# Patient Record
Sex: MALE | Race: WHITE | NOT HISPANIC OR LATINO | Employment: OTHER | URBAN - NONMETROPOLITAN AREA
[De-identification: names, ages, dates, MRNs, and addresses within clinical notes are randomized per-mention and may not be internally consistent; named-entity substitution may affect disease eponyms.]

---

## 2024-06-17 LAB
NON-UH HIE BASOPHILS # (AUTO): 0.1 10*3/UL (ref 0–0.2)
NON-UH HIE BASOPHILS % (AUTO): 1.1 %
NON-UH HIE EOSINOPHILS # (AUTO): 0 10*3/UL (ref 0–0.45)
NON-UH HIE EOSINOPHILS % (AUTO): 0.9 %
NON-UH HIE HEMATOCRIT: 35.4 % (ref 38.8–50)
NON-UH HIE HEMOGLOBIN: 11.5 G/DL (ref 13–17)
NON-UH HIE LYMPHOCYTES # (AUTO): 1.4 10*3/UL (ref 1–4.8)
NON-UH HIE LYMPHOCYTES % (AUTO): 28.4 %
NON-UH HIE MEAN CORPUSCULAR HEMOGLOBIN: 29.4 PG (ref 27.5–35.2)
NON-UH HIE MEAN CORPUSCULAR HGB CONC: 32.6 G/DL (ref 32.5–35.6)
NON-UH HIE MEAN CORPUSCULAR VOLUME: 90.2 FL (ref 83.5–101)
NON-UH HIE MEAN PLATELET VOLUME: 7.9 FL (ref 6.6–10.1)
NON-UH HIE MONOCYTES # (AUTO): 0.5 10*3/UL (ref 0–0.8)
NON-UH HIE MONOCYTES % (AUTO): 9.7 %
NON-UH HIE NEUTROPHILS # (AUTO): 3 10*3/UL (ref 1.8–7.7)
NON-UH HIE NEUTROPHILS % (AUTO): 59.9 %
NON-UH HIE NRBC%: 0.1 /100{WBC} (ref 0–0.5)
NON-UH HIE PLATELET COUNT: 165 10*3/UL (ref 150–450)
NON-UH HIE RED BLOOD COUNT: 3.92 (ref 3.9–5.6)
NON-UH HIE RED CELL DISTRIBUTION WIDTH: 16.2 % (ref 12–14.8)
NON-UH HIE UNCORRECTED WBC: 5.1 10*3/UL (ref 4.1–10.5)
NON-UH HIE WHITE BLOOD COUNT: 5.1 10*3/UL (ref 4.1–10.5)

## 2024-06-28 ENCOUNTER — OFFICE VISIT (OUTPATIENT)
Dept: CARDIOLOGY | Facility: CLINIC | Age: 84
End: 2024-06-28
Payer: COMMERCIAL

## 2024-06-28 VITALS
WEIGHT: 126 LBS | SYSTOLIC BLOOD PRESSURE: 86 MMHG | HEART RATE: 74 BPM | HEIGHT: 69 IN | BODY MASS INDEX: 18.66 KG/M2 | DIASTOLIC BLOOD PRESSURE: 48 MMHG

## 2024-06-28 DIAGNOSIS — Z01.810 PREOP CARDIOVASCULAR EXAM: ICD-10-CM

## 2024-06-28 DIAGNOSIS — I48.0 PAROXYSMAL ATRIAL FIBRILLATION (MULTI): ICD-10-CM

## 2024-06-28 DIAGNOSIS — F17.200 CURRENT SMOKER: ICD-10-CM

## 2024-06-28 DIAGNOSIS — R94.31 ABNORMAL EKG: ICD-10-CM

## 2024-06-28 DIAGNOSIS — I25.10 CORONARY ARTERY DISEASE INVOLVING NATIVE CORONARY ARTERY OF NATIVE HEART, UNSPECIFIED WHETHER ANGINA PRESENT: ICD-10-CM

## 2024-06-28 DIAGNOSIS — Z98.61 HISTORY OF PTCA: ICD-10-CM

## 2024-06-28 DIAGNOSIS — I25.5 CARDIOMYOPATHY, ISCHEMIC: ICD-10-CM

## 2024-06-28 DIAGNOSIS — K56.699 OTHER SPECIFIED INTESTINAL OBSTRUCTION, UNSPECIFIED WHETHER PARTIAL OR COMPLETE (MULTI): Primary | ICD-10-CM

## 2024-06-28 DIAGNOSIS — N18.31 CHRONIC KIDNEY DISEASE (CKD) STAGE G3A/A3, MODERATELY DECREASED GLOMERULAR FILTRATION RATE (GFR) BETWEEN 45-59 ML/MIN/1.73 SQUARE METER AND ALBUMINURIA CREATININE RATIO GREATER THAN 300 MG/G * (MULTI): ICD-10-CM

## 2024-06-28 DIAGNOSIS — Z79.899 HIGH RISK MEDICATION USE: ICD-10-CM

## 2024-06-28 PROBLEM — Z95.828 HX OF ENDOVASCULAR STENT GRAFT FOR ABDOMINAL AORTIC ANEURYSM: Status: ACTIVE | Noted: 2024-06-28

## 2024-06-28 PROBLEM — I71.40 ABDOMINAL AORTIC ANEURYSM (AAA) (CMS-HCC): Status: ACTIVE | Noted: 2024-06-28

## 2024-06-28 PROBLEM — I35.0 MILD AORTIC STENOSIS: Status: ACTIVE | Noted: 2024-06-28

## 2024-06-28 RX ORDER — PANTOPRAZOLE SODIUM 40 MG/1
40 TABLET, DELAYED RELEASE ORAL DAILY
COMMUNITY

## 2024-06-28 RX ORDER — AMIODARONE HYDROCHLORIDE 200 MG/1
200 TABLET ORAL DAILY
COMMUNITY
Start: 2024-05-18

## 2024-06-28 RX ORDER — CLOPIDOGREL BISULFATE 75 MG/1
1 TABLET ORAL DAILY
COMMUNITY
Start: 2018-05-21

## 2024-06-28 RX ORDER — ALBUTEROL SULFATE 90 UG/1
AEROSOL, METERED RESPIRATORY (INHALATION)
COMMUNITY

## 2024-06-28 RX ORDER — CITALOPRAM 20 MG/1
1 TABLET, FILM COATED ORAL DAILY
COMMUNITY

## 2024-06-28 RX ORDER — DILTIAZEM HYDROCHLORIDE 180 MG/1
180 CAPSULE, COATED, EXTENDED RELEASE ORAL DAILY
COMMUNITY

## 2024-06-28 RX ORDER — CARVEDILOL 6.25 MG/1
6.25 TABLET ORAL 2 TIMES DAILY
COMMUNITY

## 2024-06-28 RX ORDER — MIRTAZAPINE 7.5 MG/1
7.5 TABLET, FILM COATED ORAL NIGHTLY
COMMUNITY
Start: 2024-06-26 | End: 2024-12-23

## 2024-06-28 RX ORDER — NAPROXEN SODIUM 220 MG/1
1 TABLET, FILM COATED ORAL DAILY
COMMUNITY

## 2024-06-28 RX ORDER — NITROGLYCERIN 0.4 MG/1
0.4 TABLET SUBLINGUAL
COMMUNITY
Start: 2018-05-01

## 2024-06-28 NOTE — LETTER
June 28, 2024     Sanjiv Reddy MD  [address] [address]    Patient: Jacob Frost   YOB: 1940   Date of Visit: 6/28/2024       Dear Dr. Sanjiv Reddy MD:    Thank you for referring Jacob Frost to me for evaluation. Below are my notes for this consultation.  If you have questions, please do not hesitate to call me. I look forward to following your patient along with you.       Sincerely,     Viral Valle MD      CC: No Recipients  ______________________________________________________________________________________    Cardiology Consultation- New Consult    Reason for referral: Cardiac clearance prior to colonoscopy.    HPI: Jacob Frost is a 84 y.o. male who is being seen at request of gastroenterology for cardiac risk assessment prior to colonoscopy scheduled in the near future.  The patient has extensive cardiac history which I reviewed including old records we have in our system.  His last cardiac catheterization in January 2021 by Dr. White demonstrated 80% RCA stenosis requiring placement of drug-eluting stent, his previously positioned stent in the LAD/diagonal bifurcation was widely patent.  At the time his ejection fraction was 30% and his left main and left circumflex have no significant disease.  He has history of paroxysmal afibrillation which has been treated with amiodarone but no anticoagulation.  The patient has followed with PCP Dr. Jacob Yan since that time and has not followed in our office.  6 weeks ago the patient had urgent abdominal surgery at [facility] for twisted small intestine, the underlying pathology is not known to me.  He had part of the ileum resected and now is scheduled for colonoscopy for follow-up.  He is not aware of cancer, colitis or Crohn's disease.  The workup is to further investigate the pathology.  Currently he reports no angina or heart failure symptoms.  Since the initial intervention few weeks ago he  lost nearly 30 pounds.  He has no hematuria melena or hematochezia.  He has no abdominal pain nausea or vomiting but his appetite is poor.  He is a regular pack-a-day smoker and he is status post AAA EVAR.  He has mild COPD.  He is a retired .  He currently has no symptoms suggestive of unstable heart failure, cardiac arrhythmias or ischemic heart disease.  He has not had a stress test since the intervention in 2021.    Assessment/recommendations:    1-patient need cardiac clearance prior to colonoscopy.  His cardiac risks at the present time are considered to be acceptable given the absence of decompensated heart failure, significant cardiac arrhythmias or unstable angina.  The procedure to proceed with his low risk procedure.  No further cardiac testing will be necessary prior to clearing him for surgery.  A letter to that effect will be sent to Dr. Reddy  2-extensive cardiac history with multivessel coronary artery disease status post PCI of the LAD diagonal bifurcation in the remote past and PCI of the RCA in 2021.  His left circumflex has no significant disease and left main has no significant disease.  Aggressive risk factors modification was recommended and regular cardiac follow-up was encouraged.  Plavix has been placed on hold in preparation for the procedure.  Tobacco cessation was advocated strongly.  The patient need to be on statin therapy  3-ischemic cardiomyopathy, compensated for the time being.  Ejection fraction 30-35%.  He is on beta-blocker and diltiazem.  Diltiazem need to be discontinued and replaced by an ARB such as valsartan 40 mg daily.  Discussed with the patient eventually consideration for AICD but he will need an echocardiogram before that is considered.  Continue carvedilol  4-status post EVAR for AAA, functioning properly  5- stage III chronic kidney disease, creatinine 1.3 mg/dL June 2024, creatinine clearance 52 mL/min.  Will monitor  6-paroxysmal atrial fibrillation  currently on amiodarone and in sinus rhythm, he is not anticoagulated will discuss valve in later appointment  7-high risk medication with amiodarone, will initiate amiodarone testing later on.  8-dyslipidemia, currently not on statin therapy.  Will address at after surgery  9-active tobacco use, was counseled on the need for tobacco cessation        Past Medical History:   He has a past medical history of Personal history of peptic ulcer disease and Stenosis of bilateral lacrimal sac.    Surgical History:   He has a past surgical history that includes Other surgical history (12/11/2017); Tonsillectomy (12/11/2017); and Other surgical history (09/05/2018).    Family History:   No family history on file.    Social History:   Social History     Tobacco Use   • Smoking status: Every Day     Types: Cigarettes   • Smokeless tobacco: Never   Substance Use Topics   • Alcohol use: Not Currently        Allergies:  Iodinated contrast media, Statins-hmg-coa reductase inhibitors, Codeine, Lisinopril, and Penicillins     Current Medications:    Current Outpatient Medications:   •  albuterol (Ventolin HFA) 90 mcg/actuation inhaler, Inhale 2 puffs every 4 hours by inhalation route., Disp: , Rfl:   •  amiodarone (Pacerone) 200 mg tablet, Take 1 tablet (200 mg) by mouth once daily., Disp: , Rfl:   •  aspirin 81 mg chewable tablet, Chew 1 tablet (81 mg) once daily., Disp: , Rfl:   •  carvedilol (Coreg) 6.25 mg tablet, Take 1 tablet (6.25 mg) by mouth 2 times a day., Disp: , Rfl:   •  citalopram (CeleXA) 20 mg tablet, Take 1 tablet (20 mg) by mouth once daily., Disp: , Rfl:   •  clopidogrel (Plavix) 75 mg tablet, Take 1 tablet (75 mg) by mouth once daily., Disp: , Rfl:   •  dilTIAZem CD (Cardizem CD) 180 mg 24 hr capsule, Take 1 capsule (180 mg) by mouth once daily., Disp: , Rfl:   •  mirtazapine (Remeron) 7.5 mg tablet, Take 1 tablet (7.5 mg) by mouth once daily at bedtime., Disp: , Rfl:   •  nitroglycerin (Nitrostat) 0.4 mg SL  "tablet, Place 1 tablet (0.4 mg) under the tongue., Disp: , Rfl:   •  pantoprazole (ProtoNix) 40 mg EC tablet, Take 1 tablet (40 mg) by mouth once daily., Disp: , Rfl:   •  ubidecarenone (COENZYME Q10, BULK, MISC), Take 200 mg by mouth., Disp: , Rfl:      Vitals:  Vitals:    06/28/24 1143 06/28/24 1144   BP: 124/50 (!) 86/48   BP Location: Left arm Right arm   Patient Position: Sitting Sitting   Pulse: 74    Weight: 57.2 kg (126 lb)    Height: 1.753 m (5' 9\")     EKG done in office today        Review of Systems   All other systems reviewed and are negative.      Objective        Physical Exam  Constitutional:       Appearance: Normal appearance.   HENT:      Nose: Nose normal.   Neck:      Vascular: No carotid bruit.   Cardiovascular:      Rate and Rhythm: Normal rate.      Pulses: Normal pulses.      Heart sounds: Normal heart sounds.   Pulmonary:      Effort: Pulmonary effort is normal.   Abdominal:      General: Bowel sounds are normal.      Palpations: Abdomen is soft.   Musculoskeletal:         General: Normal range of motion.      Cervical back: Normal range of motion.      Right lower leg: No edema.      Left lower leg: No edema.   Skin:     General: Skin is warm and dry.   Neurological:      General: No focal deficit present.      Mental Status: He is alert.   Psychiatric:         Mood and Affect: Mood normal.         Behavior: Behavior normal.         Thought Content: Thought content normal.         Judgment: Judgment normal.                Assessment and Plan:   1. Preop cardiovascular exam        2. Abnormal EKG        3. Cardiomyopathy, ischemic        4. Coronary artery disease involving native coronary artery of native heart, unspecified whether angina present        5. History of PTCA        6. Paroxysmal atrial fibrillation (Multi)        7. High risk medication use        8. Mild aortic stenosis        9. Current smoker        10. BMI less than 19,adult               Scribe Attestation  By signing " my name below, I, Robina Chandra LPN   attest that this documentation has been prepared under the direction and in the presence of Viral Valle MD.    Provider Attestation - Scribe documentation    All medical record entries made by the Scribe were at my direction and personally dictated by me. I have reviewed the chart and agree that the record accurately reflects my personal performance of the history, physical exam, discussion and plan.

## 2024-06-28 NOTE — PROGRESS NOTES
Cardiology Consultation- New Consult    Reason for referral: Cardiac clearance prior to colonoscopy.    HPI: Jacob Frost is a 84 y.o. male who is being seen at request of gastroenterology for cardiac risk assessment prior to colonoscopy scheduled in the near future.  The patient has extensive cardiac history which I reviewed including old records we have in our system.  His last cardiac catheterization in January 2021 by Dr. White demonstrated 80% RCA stenosis requiring placement of drug-eluting stent, his previously positioned stent in the LAD/diagonal bifurcation was widely patent.  At the time his ejection fraction was 30% and his left main and left circumflex have no significant disease.  He has history of paroxysmal afibrillation which has been treated with amiodarone but no anticoagulation.  The patient has followed with PCP Dr. Jacob Yan since that time and has not followed in our office.  6 weeks ago the patient had urgent abdominal surgery at [facility] for twisted small intestine, the underlying pathology is not known to me.  He had part of the ileum resected and now is scheduled for colonoscopy for follow-up.  He is not aware of cancer, colitis or Crohn's disease.  The workup is to further investigate the pathology.  Currently he reports no angina or heart failure symptoms.  Since the initial intervention few weeks ago he lost nearly 30 pounds.  He has no hematuria melena or hematochezia.  He has no abdominal pain nausea or vomiting but his appetite is poor.  He is a regular pack-a-day smoker and he is status post AAA EVAR.  He has mild COPD.  He is a retired .  He currently has no symptoms suggestive of unstable heart failure, cardiac arrhythmias or ischemic heart disease.  He has not had a stress test since the intervention in 2021.    Assessment/recommendations:    1-patient need cardiac clearance prior to colonoscopy.  His cardiac risks at the present time are considered to be  acceptable given the absence of decompensated heart failure, significant cardiac arrhythmias or unstable angina.  The procedure to proceed with his low risk procedure.  No further cardiac testing will be necessary prior to clearing him for surgery.  A letter to that effect will be sent to Dr. Reddy  2-extensive cardiac history with multivessel coronary artery disease status post PCI of the LAD diagonal bifurcation in the remote past and PCI of the RCA in 2021.  His left circumflex has no significant disease and left main has no significant disease.  Aggressive risk factors modification was recommended and regular cardiac follow-up was encouraged.  Plavix has been placed on hold in preparation for the procedure.  Tobacco cessation was advocated strongly.  The patient need to be on statin therapy  3-ischemic cardiomyopathy, compensated for the time being.  Ejection fraction 30-35%.  He is on beta-blocker and diltiazem.  Diltiazem need to be discontinued and replaced by an ARB such as valsartan 40 mg daily.  Discussed with the patient eventually consideration for AICD but he will need an echocardiogram before that is considered.  Continue carvedilol  4-status post EVAR for AAA, functioning properly  5- stage III chronic kidney disease, creatinine 1.3 mg/dL June 2024, creatinine clearance 52 mL/min.  Will monitor  6-paroxysmal atrial fibrillation currently on amiodarone and in sinus rhythm, he is not anticoagulated will discuss valve in later appointment  7-high risk medication with amiodarone, will initiate amiodarone testing later on.  8-dyslipidemia, currently not on statin therapy.  Will address at after surgery  9-active tobacco use, was counseled on the need for tobacco cessation        Past Medical History:   He has a past medical history of Personal history of peptic ulcer disease and Stenosis of bilateral lacrimal sac.    Surgical History:   He has a past surgical history that includes Other surgical history  "(12/11/2017); Tonsillectomy (12/11/2017); and Other surgical history (09/05/2018).    Family History:   No family history on file.    Social History:   Social History     Tobacco Use    Smoking status: Every Day     Types: Cigarettes    Smokeless tobacco: Never   Substance Use Topics    Alcohol use: Not Currently        Allergies:  Iodinated contrast media, Statins-hmg-coa reductase inhibitors, Codeine, Lisinopril, and Penicillins     Current Medications:    Current Outpatient Medications:     albuterol (Ventolin HFA) 90 mcg/actuation inhaler, Inhale 2 puffs every 4 hours by inhalation route., Disp: , Rfl:     amiodarone (Pacerone) 200 mg tablet, Take 1 tablet (200 mg) by mouth once daily., Disp: , Rfl:     aspirin 81 mg chewable tablet, Chew 1 tablet (81 mg) once daily., Disp: , Rfl:     carvedilol (Coreg) 6.25 mg tablet, Take 1 tablet (6.25 mg) by mouth 2 times a day., Disp: , Rfl:     citalopram (CeleXA) 20 mg tablet, Take 1 tablet (20 mg) by mouth once daily., Disp: , Rfl:     clopidogrel (Plavix) 75 mg tablet, Take 1 tablet (75 mg) by mouth once daily., Disp: , Rfl:     dilTIAZem CD (Cardizem CD) 180 mg 24 hr capsule, Take 1 capsule (180 mg) by mouth once daily., Disp: , Rfl:     mirtazapine (Remeron) 7.5 mg tablet, Take 1 tablet (7.5 mg) by mouth once daily at bedtime., Disp: , Rfl:     nitroglycerin (Nitrostat) 0.4 mg SL tablet, Place 1 tablet (0.4 mg) under the tongue., Disp: , Rfl:     pantoprazole (ProtoNix) 40 mg EC tablet, Take 1 tablet (40 mg) by mouth once daily., Disp: , Rfl:     ubidecarenone (COENZYME Q10, BULK, MISC), Take 200 mg by mouth., Disp: , Rfl:      Vitals:  Vitals:    06/28/24 1143 06/28/24 1144   BP: 124/50 (!) 86/48   BP Location: Left arm Right arm   Patient Position: Sitting Sitting   Pulse: 74    Weight: 57.2 kg (126 lb)    Height: 1.753 m (5' 9\")     EKG done in office today        Review of Systems   All other systems reviewed and are negative.      Objective         Physical " Exam  Constitutional:       Appearance: Normal appearance.   HENT:      Nose: Nose normal.   Neck:      Vascular: No carotid bruit.   Cardiovascular:      Rate and Rhythm: Normal rate.      Pulses: Normal pulses.      Heart sounds: Normal heart sounds.   Pulmonary:      Effort: Pulmonary effort is normal.   Abdominal:      General: Bowel sounds are normal.      Palpations: Abdomen is soft.   Musculoskeletal:         General: Normal range of motion.      Cervical back: Normal range of motion.      Right lower leg: No edema.      Left lower leg: No edema.   Skin:     General: Skin is warm and dry.   Neurological:      General: No focal deficit present.      Mental Status: He is alert.   Psychiatric:         Mood and Affect: Mood normal.         Behavior: Behavior normal.         Thought Content: Thought content normal.         Judgment: Judgment normal.                Assessment and Plan:   1. Preop cardiovascular exam        2. Abnormal EKG        3. Cardiomyopathy, ischemic        4. Coronary artery disease involving native coronary artery of native heart, unspecified whether angina present        5. History of PTCA        6. Paroxysmal atrial fibrillation (Multi)        7. High risk medication use        8. Mild aortic stenosis        9. Current smoker        10. BMI less than 19,adult               Scribe Attestation  By signing my name below, IJosselin LPN Scribe   attest that this documentation has been prepared under the direction and in the presence of Viral Valle MD.    Provider Attestation - Scribe documentation    All medical record entries made by the Scribe were at my direction and personally dictated by me. I have reviewed the chart and agree that the record accurately reflects my personal performance of the history, physical exam, discussion and plan.

## 2024-06-28 NOTE — PATIENT INSTRUCTIONS
Please bring all medicines, vitamins, and herbal supplements with you when you come to the office.    Prescriptions will not be filled unless you are compliant with your follow up appointments or have a follow up appointment scheduled as per instruction of your physician. Refills should be requested at the time of your visit.     BMI was above normal measurement. Current weight: 57.2 kg (126 lb)  Weight change since last visit (-) denotes wt loss -35 lbs   Weight loss needed to achieve BMI 25: -42.9 Lbs  Weight loss needed to achieve BMI 30: -76.7 Lbs  BMI was below normal measurement. Current weight: 57.2 kg (126 lb)  Weight change since last visit (-) denotes wt loss -35 lbs   Weight gain needed to achieve  BMI of 18.5 = 1 Lbs    Instructed patient to consume high calorie, high protein diet.    Amiodarone follow up per routine     Jacob Frost is clear for surgery from a cardiac standpoint, hold plavix 5 days prior to procedure

## 2024-07-23 ENCOUNTER — HOSPITAL ENCOUNTER (EMERGENCY)
Age: 84
Discharge: LEFT BEFORE DISCHARGE | End: 2024-07-23
Payer: COMMERCIAL

## 2024-07-23 VITALS
OXYGEN SATURATION: 96 % | TEMPERATURE: 98.1 F | DIASTOLIC BLOOD PRESSURE: 70 MMHG | SYSTOLIC BLOOD PRESSURE: 122 MMHG | HEART RATE: 96 BPM

## 2024-07-23 VITALS — BODY MASS INDEX: 19.5 KG/M2

## 2024-07-23 DIAGNOSIS — M79.89: Primary | ICD-10-CM

## 2024-07-23 DIAGNOSIS — Z53.29: ICD-10-CM

## 2024-07-23 PROCEDURE — 93971 EXTREMITY STUDY: CPT

## 2024-07-23 PROCEDURE — 99284 EMERGENCY DEPT VISIT MOD MDM: CPT

## 2024-08-05 ENCOUNTER — HOSPITAL ENCOUNTER (OUTPATIENT)
Dept: RADIOLOGY | Facility: CLINIC | Age: 84
Discharge: HOME | End: 2024-08-05
Payer: COMMERCIAL

## 2024-08-05 ENCOUNTER — HOSPITAL ENCOUNTER (OUTPATIENT)
Dept: CARDIOLOGY | Facility: CLINIC | Age: 84
Discharge: HOME | End: 2024-08-05
Payer: COMMERCIAL

## 2024-08-05 VITALS — HEART RATE: 60 BPM | SYSTOLIC BLOOD PRESSURE: 126 MMHG | DIASTOLIC BLOOD PRESSURE: 84 MMHG

## 2024-08-05 DIAGNOSIS — I25.5 CARDIOMYOPATHY, ISCHEMIC: ICD-10-CM

## 2024-08-05 DIAGNOSIS — R94.31 ABNORMAL EKG: ICD-10-CM

## 2024-08-05 DIAGNOSIS — I25.10 CORONARY ARTERY DISEASE INVOLVING NATIVE CORONARY ARTERY OF NATIVE HEART, UNSPECIFIED WHETHER ANGINA PRESENT: ICD-10-CM

## 2024-08-05 DIAGNOSIS — Z01.810 PREOP CARDIOVASCULAR EXAM: ICD-10-CM

## 2024-08-05 PROCEDURE — 93017 CV STRESS TEST TRACING ONLY: CPT

## 2024-08-05 PROCEDURE — A9502 TC99M TETROFOSMIN: HCPCS | Performed by: INTERNAL MEDICINE

## 2024-08-05 PROCEDURE — 3430000001 HC RX 343 DIAGNOSTIC RADIOPHARMACEUTICALS: Performed by: INTERNAL MEDICINE

## 2024-08-05 PROCEDURE — 2500000004 HC RX 250 GENERAL PHARMACY W/ HCPCS (ALT 636 FOR OP/ED): Performed by: INTERNAL MEDICINE

## 2024-08-05 PROCEDURE — 78452 HT MUSCLE IMAGE SPECT MULT: CPT

## 2024-08-05 RX ORDER — REGADENOSON 0.08 MG/ML
0.4 INJECTION, SOLUTION INTRAVENOUS ONCE
Status: COMPLETED | OUTPATIENT
Start: 2024-08-05 | End: 2024-08-05

## 2024-08-06 ENCOUNTER — TELEPHONE (OUTPATIENT)
Dept: CARDIOLOGY | Facility: CLINIC | Age: 84
End: 2024-08-06
Payer: COMMERCIAL

## 2024-08-06 DIAGNOSIS — I25.5 CARDIOMYOPATHY, ISCHEMIC: ICD-10-CM

## 2024-08-06 RX ORDER — VALSARTAN 40 MG/1
40 TABLET ORAL DAILY
Qty: 90 TABLET | Refills: 3 | Status: SHIPPED | OUTPATIENT
Start: 2024-08-06 | End: 2025-08-06

## 2024-08-06 RX ORDER — CARVEDILOL 6.25 MG/1
6.25 TABLET ORAL 2 TIMES DAILY
Qty: 180 TABLET | Refills: 3 | Status: SHIPPED | OUTPATIENT
Start: 2024-08-06 | End: 2025-08-06

## 2024-08-06 NOTE — TELEPHONE ENCOUNTER
----- Message from Viral Valle sent at 8/6/2024  8:35 AM EDT -----  If he has not had a colonoscopy they can proceed, if he had it already nevermind.  Let him know that his stress test showed no ischemia but previous infarction, ejection fraction down to 22%.  As suggested in the office he need to stop diltiazem and we started him on carvedilol.  Also recommended going on valsartan 40 mg daily.  He needs to be seriously considered for AICD to protect him from sudden cardiac death  ----- Message -----  From: Interface, Radiology Results In  Sent: 8/5/2024   3:36 PM EDT  To: Viral Valle MD

## 2024-08-06 NOTE — TELEPHONE ENCOUNTER
Phoned patient to discuss results and recommendations. States he is not interested in a AICD even if it would prevent sudden cardiac death; states he is 84 and lived passed the average man, willing to take the chance. Verbalized understanding.    To Dr. Viral Valle MD

## 2024-08-12 NOTE — TELEPHONE ENCOUNTER
Patient phoned with concerns from recent medication changes. Complaining of dizziness, weakness, feeling faint, unsteady, falling and low blood pressure (70/40s). Took the meds for 5 days straight and stopped today and is feeling better. He also wanted you to be aware that he has right foot drop. Please advise for further direction.    To Dr. Viral Valle MD

## 2024-08-13 NOTE — TELEPHONE ENCOUNTER
Patient phoned back. Instructed him to stop valsartan and to remain on coreg. He verbalized understanding. Med list updated.

## 2024-09-27 ENCOUNTER — APPOINTMENT (OUTPATIENT)
Dept: CARDIOLOGY | Facility: CLINIC | Age: 84
End: 2024-09-27
Payer: COMMERCIAL

## 2024-09-27 VITALS — SYSTOLIC BLOOD PRESSURE: 132 MMHG | HEART RATE: 82 BPM | DIASTOLIC BLOOD PRESSURE: 66 MMHG

## 2024-09-27 DIAGNOSIS — Z91.199 MEDICALLY NONCOMPLIANT: ICD-10-CM

## 2024-09-27 DIAGNOSIS — Z95.828 HX OF ENDOVASCULAR STENT GRAFT FOR ABDOMINAL AORTIC ANEURYSM: ICD-10-CM

## 2024-09-27 DIAGNOSIS — I25.5 CARDIOMYOPATHY, ISCHEMIC: Primary | ICD-10-CM

## 2024-09-27 DIAGNOSIS — I25.10 CORONARY ARTERY DISEASE INVOLVING NATIVE CORONARY ARTERY OF NATIVE HEART, UNSPECIFIED WHETHER ANGINA PRESENT: ICD-10-CM

## 2024-09-27 DIAGNOSIS — E78.5 DYSLIPIDEMIA: ICD-10-CM

## 2024-09-27 DIAGNOSIS — F17.200 CURRENT SMOKER: ICD-10-CM

## 2024-09-27 DIAGNOSIS — I34.0 MODERATE MITRAL REGURGITATION: ICD-10-CM

## 2024-09-27 DIAGNOSIS — I48.0 PAROXYSMAL ATRIAL FIBRILLATION (MULTI): ICD-10-CM

## 2024-09-27 DIAGNOSIS — I35.0 MILD AORTIC STENOSIS: ICD-10-CM

## 2024-09-27 PROCEDURE — 99214 OFFICE O/P EST MOD 30 MIN: CPT | Performed by: INTERNAL MEDICINE

## 2024-09-27 PROCEDURE — 1159F MED LIST DOCD IN RCRD: CPT | Performed by: INTERNAL MEDICINE

## 2024-09-27 ASSESSMENT — ENCOUNTER SYMPTOMS: SHORTNESS OF BREATH: 1

## 2024-09-27 NOTE — PATIENT INSTRUCTIONS
Please bring all medicines, vitamins, and herbal supplements with you when you come to the office.    Prescriptions will not be filled unless you are compliant with your follow up appointments or have a follow up appointment scheduled as per instruction of your physician. Refills should be requested at the time of your visit.     BMI was above normal measurement. Current weight:    Weight change since last visit (-) denotes wt loss     Weight loss needed to achieve BMI 25:   Lbs  Weight loss needed to achieve BMI 30:   Lbs  Provided instructions on dietary changes.    Echo- limited  lipid  Follow up

## 2024-09-27 NOTE — LETTER
September 27, 2024     Jacob Yan MD  [address]  [facility] [address]    Patient: Jacob Frost   YOB: 1940   Date of Visit: 9/27/2024       Dear Dr. Jacob Yan MD:    Thank you for referring Jacob Frost to me for evaluation. Below are my notes for this consultation.  If you have questions, please do not hesitate to call me. I look forward to following your patient along with you.       Sincerely,     Viral Valle MD      CC: No Recipients  ______________________________________________________________________________________    Subjective   Jacob Frost is a 84 y.o. male            HPI     Patient is in the office for follow-up for the problems noted below.  This is a very difficult patient to manage due to his reservation on taking guideline directed medical therapy for coronary artery disease and ischemic cardiomyopathy.  Since he was last seen in June 2024 he had nuclear stress test which revealed ejection fraction of 22% with large amount of scarring mostly in the LAD territory.  The echocardiogram in May 2024, revealed ejection fraction of 30-35% with moderate mitral regurgitation and mild aortic stenosis.  The patient stopped taking the valsartan and amiodarone and reduced the carvedilol down to once a day.  He does not want to go on statin therapy.  Fortunately he did not have any indication of decompensated heart failure so far.  Also denies any symptoms of angina pectoris and no symptoms of palpitations and appears to be in regular rhythm today.  We did not do an EKG.  He reports no orthopnea PND or lower extremity edema.  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  He wants to know what his ejection fraction is compared to his stress test in June.  I recommended doing a limited echocardiogram to reassess ejection fraction.  Examination was normal    Assessment/recommendations:     1-severe left  ventricular systolic dysfunction with ischemic cardiomyopathy, ejection fraction 22% by nuclear stress test June 2024 and 30-35% by echocardiogram May 2024.  He is stage C functional class II NYHA.  Currently taking only carvedilol 6.25 mg once daily.  This is challenging for management since the patient wishes for as little medicine as possible.  GDMT requires aggressive treatment with multiple medications.  I agreed with the patient for the time being since he is asymptomatic at to repeat the echocardiogram to reassess ejection fraction I did point out to the patient that if ejection fraction is below normal GDMT has to be applied but it is up to him whether he wants to take it or not.  I did point out to the patient the risk of sudden cardiac death in the situation as well.  AICD will clearly be indicated if we fail to improve ejection fraction with medical therapy.  2-extensive cardiac history with multivessel coronary artery disease status post PCI of the LAD diagonal bifurcation in the remote past and PCI of the RCA in 2021.  His left circumflex has no significant disease and left main has no significant disease.  Aggressive risk factors modification was recommended and regular cardiac follow-up was encouraged.  The patient currently is not taking statin or aspirin.  I will revisit the issue once we obtain his follow-up ejection fraction.  3-ischemic cardiomyopathy, compensated for the time being.  Ejection fraction 30-35% by echo May 2024 and 22% by nuclear stress test June 2024.  He is on only once daily carvedilol 6.25 mg daily which is nowhere near what he actually need for his condition.  Once I repeat the echocardiogram and confirmed that ejection fraction remains markedly depressed we will revisit the issue of medical therapy and see if he responds to my recommendations or not.  Sudden cardiac death was discussed today and the need to protect him with AICD was explained to him.  4-status post EVAR for  AAA, functioning properly  5- stage III chronic kidney disease, creatinine 1.3 mg/dL June 2024, creatinine clearance 52 mL/min.  Will monitor  6-paroxysmal atrial fibrillation, currently his rhythm is regular, he stopped taking the amiodarone on his own.  He is not anticoagulated by choice.  Chance of recurrent atrial fibrillation is quite high.  7- dyslipidemia, currently not on statin therapy by choice  9-active tobacco use, was counseled on the need for tobacco cessation  10-moderate mitral regurgitation to be monitored noninvasively, asymptomatic  11-mild aortic stenosis, currently inconsequential     Review of Systems   Respiratory:  Positive for shortness of breath.             Vitals:    09/27/24 1507   BP: 132/66   BP Location: Right arm   Patient Position: Sitting   Pulse: 82        Objective   Physical Exam  Constitutional:       Appearance: Normal appearance.   HENT:      Nose: Nose normal.   Neck:      Vascular: No carotid bruit.   Cardiovascular:      Rate and Rhythm: Normal rate.      Pulses: Normal pulses.      Heart sounds: Normal heart sounds.   Pulmonary:      Effort: Pulmonary effort is normal.   Abdominal:      General: Bowel sounds are normal.      Palpations: Abdomen is soft.   Musculoskeletal:         General: Normal range of motion.      Cervical back: Normal range of motion.      Right lower leg: No edema.      Left lower leg: No edema.   Skin:     General: Skin is warm and dry.   Neurological:      General: No focal deficit present.      Mental Status: He is alert.   Psychiatric:         Mood and Affect: Mood normal.         Behavior: Behavior normal.         Thought Content: Thought content normal.         Judgment: Judgment normal.         Allergies  Iodinated contrast media, Statins-hmg-coa reductase inhibitors, Codeine, Lisinopril, and Penicillins     Current Medications    Current Outpatient Medications:   •  carvedilol (Coreg) 6.25 mg tablet, Take 1 tablet (6.25 mg) by mouth 2 times a  day., Disp: 180 tablet, Rfl: 3  •  citalopram (CeleXA) 20 mg tablet, Take 1 tablet (20 mg) by mouth once daily., Disp: , Rfl:   •  nitroglycerin (Nitrostat) 0.4 mg SL tablet, Place 1 tablet (0.4 mg) under the tongue., Disp: , Rfl:   •  pantoprazole (ProtoNix) 40 mg EC tablet, Take 1 tablet (40 mg) by mouth once daily., Disp: , Rfl:   •  ubidecarenone (COENZYME Q10, BULK, MISC), Take 200 mg by mouth., Disp: , Rfl:                      Assessment/Plan   1. Cardiomyopathy, ischemic  Transthoracic Echo Limited      2. Coronary artery disease involving native coronary artery of native heart, unspecified whether angina present  Follow Up In Cardiology    Transthoracic Echo Limited    Lipid Panel    Lipid Panel      3. Medically noncompliant        4. Paroxysmal atrial fibrillation (Multi)  Follow Up In Cardiology      5. Hx of endovascular stent graft for abdominal aortic aneurysm        6. Moderate mitral regurgitation        7. Dyslipidemia        8. Mild aortic stenosis  Transthoracic Echo Limited      9. BMI less than 19,adult        10. Current smoker                 Scribe Attestation  By signing my name below, Lashon MENDEZ LPN, Scribe   attest that this documentation has been prepared under the direction and in the presence of Viral Valle MD.     Provider Attestation - Scribe documentation    All medical record entries made by the Scribe were at my direction and personally dictated by me. I have reviewed the chart and agree that the record accurately reflects my personal performance of the history, physical exam, discussion and plan.

## 2024-09-27 NOTE — PROGRESS NOTES
Subjective   Jacob Frost is a 84 y.o. male            HPI     Patient is in the office for follow-up for the problems noted below.  This is a very difficult patient to manage due to his reservation on taking guideline directed medical therapy for coronary artery disease and ischemic cardiomyopathy.  Since he was last seen in June 2024 he had nuclear stress test which revealed ejection fraction of 22% with large amount of scarring mostly in the LAD territory.  The echocardiogram in May 2024, revealed ejection fraction of 30-35% with moderate mitral regurgitation and mild aortic stenosis.  The patient stopped taking the valsartan and amiodarone and reduced the carvedilol down to once a day.  He does not want to go on statin therapy.  Fortunately he did not have any indication of decompensated heart failure so far.  Also denies any symptoms of angina pectoris and no symptoms of palpitations and appears to be in regular rhythm today.  We did not do an EKG.  He reports no orthopnea PND or lower extremity edema.  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  He wants to know what his ejection fraction is compared to his stress test in June.  I recommended doing a limited echocardiogram to reassess ejection fraction.  Examination was normal    Assessment/recommendations:     1-severe left ventricular systolic dysfunction with ischemic cardiomyopathy, ejection fraction 22% by nuclear stress test June 2024 and 30-35% by echocardiogram May 2024.  He is stage C functional class II NYHA.  Currently taking only carvedilol 6.25 mg once daily.  This is challenging for management since the patient wishes for as little medicine as possible.  GDMT requires aggressive treatment with multiple medications.  I agreed with the patient for the time being since he is asymptomatic at to repeat the echocardiogram to reassess ejection fraction I did point out to the patient that if  ejection fraction is below normal GDMT has to be applied but it is up to him whether he wants to take it or not.  I did point out to the patient the risk of sudden cardiac death in the situation as well.  AICD will clearly be indicated if we fail to improve ejection fraction with medical therapy.  2-extensive cardiac history with multivessel coronary artery disease status post PCI of the LAD diagonal bifurcation in the remote past and PCI of the RCA in 2021.  His left circumflex has no significant disease and left main has no significant disease.  Aggressive risk factors modification was recommended and regular cardiac follow-up was encouraged.  The patient currently is not taking statin or aspirin.  I will revisit the issue once we obtain his follow-up ejection fraction.  3-ischemic cardiomyopathy, compensated for the time being.  Ejection fraction 30-35% by echo May 2024 and 22% by nuclear stress test June 2024.  He is on only once daily carvedilol 6.25 mg daily which is nowhere near what he actually need for his condition.  Once I repeat the echocardiogram and confirmed that ejection fraction remains markedly depressed we will revisit the issue of medical therapy and see if he responds to my recommendations or not.  Sudden cardiac death was discussed today and the need to protect him with AICD was explained to him.  4-status post EVAR for AAA, functioning properly  5- stage III chronic kidney disease, creatinine 1.3 mg/dL June 2024, creatinine clearance 52 mL/min.  Will monitor  6-paroxysmal atrial fibrillation, currently his rhythm is regular, he stopped taking the amiodarone on his own.  He is not anticoagulated by choice.  Chance of recurrent atrial fibrillation is quite high.  7- dyslipidemia, currently not on statin therapy by choice  9-active tobacco use, was counseled on the need for tobacco cessation  10-moderate mitral regurgitation to be monitored noninvasively, asymptomatic  11-mild aortic stenosis,  currently inconsequential     Review of Systems   Respiratory:  Positive for shortness of breath.             Vitals:    09/27/24 1507   BP: 132/66   BP Location: Right arm   Patient Position: Sitting   Pulse: 82        Objective   Physical Exam  Constitutional:       Appearance: Normal appearance.   HENT:      Nose: Nose normal.   Neck:      Vascular: No carotid bruit.   Cardiovascular:      Rate and Rhythm: Normal rate.      Pulses: Normal pulses.      Heart sounds: Normal heart sounds.   Pulmonary:      Effort: Pulmonary effort is normal.   Abdominal:      General: Bowel sounds are normal.      Palpations: Abdomen is soft.   Musculoskeletal:         General: Normal range of motion.      Cervical back: Normal range of motion.      Right lower leg: No edema.      Left lower leg: No edema.   Skin:     General: Skin is warm and dry.   Neurological:      General: No focal deficit present.      Mental Status: He is alert.   Psychiatric:         Mood and Affect: Mood normal.         Behavior: Behavior normal.         Thought Content: Thought content normal.         Judgment: Judgment normal.         Allergies  Iodinated contrast media, Statins-hmg-coa reductase inhibitors, Codeine, Lisinopril, and Penicillins     Current Medications    Current Outpatient Medications:     carvedilol (Coreg) 6.25 mg tablet, Take 1 tablet (6.25 mg) by mouth 2 times a day., Disp: 180 tablet, Rfl: 3    citalopram (CeleXA) 20 mg tablet, Take 1 tablet (20 mg) by mouth once daily., Disp: , Rfl:     nitroglycerin (Nitrostat) 0.4 mg SL tablet, Place 1 tablet (0.4 mg) under the tongue., Disp: , Rfl:     pantoprazole (ProtoNix) 40 mg EC tablet, Take 1 tablet (40 mg) by mouth once daily., Disp: , Rfl:     ubidecarenone (COENZYME Q10, BULK, MISC), Take 200 mg by mouth., Disp: , Rfl:                      Assessment/Plan   1. Cardiomyopathy, ischemic  Transthoracic Echo Limited      2. Coronary artery disease involving native coronary artery of native  heart, unspecified whether angina present  Follow Up In Cardiology    Transthoracic Echo Limited    Lipid Panel    Lipid Panel      3. Medically noncompliant        4. Paroxysmal atrial fibrillation (Multi)  Follow Up In Cardiology      5. Hx of endovascular stent graft for abdominal aortic aneurysm        6. Moderate mitral regurgitation        7. Dyslipidemia        8. Mild aortic stenosis  Transthoracic Echo Limited      9. BMI less than 19,adult        10. Current smoker                 Scribe Attestation  By signing my name below, Lashon MENDEZ LPN, Scribe   attest that this documentation has been prepared under the direction and in the presence of Viral Valle MD.     Provider Attestation - Scribe documentation    All medical record entries made by the Scribe were at my direction and personally dictated by me. I have reviewed the chart and agree that the record accurately reflects my personal performance of the history, physical exam, discussion and plan.

## 2024-11-07 ENCOUNTER — HOSPITAL ENCOUNTER (OUTPATIENT)
Dept: CARDIOLOGY | Facility: CLINIC | Age: 84
Discharge: HOME | End: 2024-11-07
Payer: COMMERCIAL

## 2024-11-07 VITALS
DIASTOLIC BLOOD PRESSURE: 70 MMHG | WEIGHT: 126 LBS | SYSTOLIC BLOOD PRESSURE: 130 MMHG | HEIGHT: 69 IN | BODY MASS INDEX: 18.66 KG/M2

## 2024-11-07 DIAGNOSIS — I35.0 MILD AORTIC STENOSIS: ICD-10-CM

## 2024-11-07 DIAGNOSIS — I25.10 CORONARY ARTERY DISEASE INVOLVING NATIVE CORONARY ARTERY OF NATIVE HEART, UNSPECIFIED WHETHER ANGINA PRESENT: ICD-10-CM

## 2024-11-07 DIAGNOSIS — I25.5 CARDIOMYOPATHY, ISCHEMIC: ICD-10-CM

## 2024-11-07 PROCEDURE — 93308 TTE F-UP OR LMTD: CPT | Performed by: INTERNAL MEDICINE

## 2024-11-07 PROCEDURE — 93308 TTE F-UP OR LMTD: CPT

## 2024-11-08 LAB
EJECTION FRACTION APICAL 4 CHAMBER: 36
EJECTION FRACTION: 30 %
LEFT VENTRICLE INTERNAL DIMENSION DIASTOLE: 6.11 CM (ref 3.5–6)
LEFT VENTRICULAR OUTFLOW TRACT DIAMETER: 2.4 CM
LV EJECTION FRACTION BIPLANE: 31 %

## 2024-11-13 ENCOUNTER — TELEPHONE (OUTPATIENT)
Dept: CARDIOLOGY | Facility: CLINIC | Age: 84
End: 2024-11-13
Payer: COMMERCIAL

## 2024-11-13 NOTE — TELEPHONE ENCOUNTER
Result Communication    Resulted Orders   Transthoracic Echo Limited   Result Value Ref Range    LV Biplane EF 31 %    LVOT diam 2.40 cm    LV EF 30 %    LVIDd 6.11 cm    LV A4C EF 36.0     Narrative                   [facility]  [address], [address], [place] [address]          Tel [phone] Fax [phone]    TRANSTHORACIC ECHOCARDIOGRAM REPORT    Patient Name:        GREGORY KHALIL HELEN  Reading Physician:    [id number] Randolph Valle MD,                                                                  [facility]  Study Date:          11/7/2024            Ordering Provider:    [id number] RANDOLPH VALLE  MRN/PID:             [id number]             Fellow:  Accession#:          [id number]         Nurse:  Date of Birth/Age:   1940 / 84 years Sonographer:          Kira Holland RDCS, T  Gender Assigned at                       Additional Staff:  Birth:  Height:              175.26 cm            Admit Date:  Weight:              57.15 kg             Admission Status:  BSA / BMI:           1.70 m2 / 18.61      Department Location:  [facility]                       kg/m2                                      [facility] [place]  Blood Pressure: 130 /70 mmHg    Study Type:    TRANSTHORACIC ECHO (TTE) LIMITED  Diagnosis/ICD: Atherosclerotic heart disease of native coronary artery without                 angina pectoris-I25.10; Ischemic cardiomyopathy-I25.5  Indication:    Aortic Stenosis, Paroxysmal Atrial Fibrillation, Hyperlipidemia,                 PTCA-2021, Tobacco Abuse, AAA-EVAR, CKD-Stage III, Noncompliance  CPT Codes:     Echo Limited-21178   Study Detail: The following Echo studies were performed: 2D and M-Mode.       PHYSICIAN INTERPRETATION:  Left Ventricle: Left ventricular ejection fraction is  moderately decreased, by visual estimate at 30%. There is severe eccentric left ventricular hypertrophy. Wall motion is abnormal. The left ventricular cavity size is mildly dilated. There is mild increased septal and normal posterior left ventricular wall thickness. Left ventricular diastolic filling was not assessed. Severe hypokinesis involving the entire inferior and lateral walls.  Left Atrium: The left atrium is mildly dilated.  Right Ventricle: The right ventricle is normal in size. There is normal right ventricular global systolic function.  Right Atrium: The right atrium is normal in size.  Aortic Valve: The aortic valve is trileaflet. There is moderate aortic valve cusp calcification. Aortic valve regurgitation was not assessed.  Mitral Valve: The mitral valve is moderately thickened. Mitral valve regurgitation was not assessed.  Tricuspid Valve: The tricuspid valve is structurally normal. Tricuspid regurgitation was not assessed.  Pulmonic Valve: The pulmonic valve is structurally normal. The pulmonic valve regurgitation was not assessed.  Pericardium: No pericardial effusion noted.  Aorta: The aortic root is normal.  Systemic Veins: The inferior vena cava was not assessed.       CONCLUSIONS:   1. Left ventricular ejection fraction is moderately decreased, by visual estimate at 30%.   2. Abnormal wall motion.   3. Left ventricular cavity size is mildly dilated.   4. Severe hypokinesis involving the entire inferior and lateral walls.   5. There is normal right ventricular global systolic function.   6. The mitral valve is moderately thickened.   7. There is moderate aortic valve cusp calcification.    QUANTITATIVE DATA SUMMARY:     2D MEASUREMENTS:            Normal Ranges:  Ao Root d:       3.20 cm    (2.0-3.7cm)  LAs:             4.10 cm    (2.7-4.0cm)  RVIDd:           2.95 cm    (0.9-3.6cm)  IVSd:            1.22 cm    (0.6-1.1cm)  LVPWd:           0.93 cm    (0.6-1.1cm)  LVIDd:           6.11 cm     (3.9-5.9cm)  LVIDs:           5.39 cm  LV Mass Index:   165.0 g/m2  LV % FS          11.8 %       LV SYSTOLIC FUNCTION BY 2D PLANIMETRY (MOD):                       Normal Ranges:  EF-A4C View:    36 % (>=55%)  EF-A2C View:    28 %  EF-Biplane:     31 %  EF-Visual:      30 %  LV EF Reported: 30 %       AORTIC VALVE:          Normal Ranges:  LVOT Diameter: 2.40 cm (1.8-2.4cm)       [id number] Viral Valle MD, FACC  Electronically signed on 11/8/2024 at 6:25:25 PM         ** Final **         1:38 PM      Results were successfully communicated with the patient and they acknowledged their understanding.

## 2024-11-13 NOTE — TELEPHONE ENCOUNTER
Phoned patient results discussed verbalized understanding. Is agreeable to medication changes. Would like a plan of action in writing mailed to him for review prior to him starting new medications/increasing doses. Please advise.    To Dr. Viral Valle MD for review.

## 2024-11-13 NOTE — TELEPHONE ENCOUNTER
----- Message from Viral Valle sent at 11/10/2024  2:38 PM EST -----  Let him know his heart function remains severely depressed with no change in the ejection fraction remains around 30%, if he would follow my recommendations which mandate titration upwards to his cardiac medications I would like to do that, if he refuses then he will follow-up with me as scheduled  ----- Message -----  From: Will [facility] - Cardiology Results In  Sent: 11/8/2024   6:25 PM EST  To: Viral Valle MD

## 2024-11-19 NOTE — TELEPHONE ENCOUNTER
Patient contacted. States he is willing to make medication changes but would like to know what is recommended before he can commit to the change. To Dr. Viral Valle MD for medication orders.

## 2024-11-20 NOTE — TELEPHONE ENCOUNTER
Patient phones back recommendations given. Orders prepped and sent to Dr. Viral Valle MD for signature.    Once lab order signed fax to [facility]

## 2024-11-27 ENCOUNTER — TELEPHONE (OUTPATIENT)
Dept: CARDIOLOGY | Facility: CLINIC | Age: 84
End: 2024-11-27
Payer: COMMERCIAL

## 2024-11-27 NOTE — TELEPHONE ENCOUNTER
Patient left a message stating his medications were changed 11/21, coreg increased and Valsartan 40 mg daily. States b/p last night and this morning reading 88/43 and he feels awful . Fatigue. States he is not going to ER and wants a return call immediately and to get with it.   To Dr. Viral Valle MD

## 2025-05-02 ENCOUNTER — APPOINTMENT (OUTPATIENT)
Dept: CARDIOLOGY | Facility: CLINIC | Age: 85
End: 2025-05-02
Payer: COMMERCIAL

## 2025-05-02 VITALS
SYSTOLIC BLOOD PRESSURE: 130 MMHG | HEART RATE: 78 BPM | HEIGHT: 69 IN | DIASTOLIC BLOOD PRESSURE: 78 MMHG | WEIGHT: 135 LBS | BODY MASS INDEX: 19.99 KG/M2

## 2025-05-02 DIAGNOSIS — N18.31 STAGE 3A CHRONIC KIDNEY DISEASE (MULTI): ICD-10-CM

## 2025-05-02 DIAGNOSIS — Z98.61 HISTORY OF PTCA: ICD-10-CM

## 2025-05-02 DIAGNOSIS — F17.200 CURRENT SMOKER: ICD-10-CM

## 2025-05-02 DIAGNOSIS — I25.10 CORONARY ARTERY DISEASE INVOLVING NATIVE CORONARY ARTERY OF NATIVE HEART, UNSPECIFIED WHETHER ANGINA PRESENT: Primary | ICD-10-CM

## 2025-05-02 DIAGNOSIS — I48.0 PAROXYSMAL ATRIAL FIBRILLATION (MULTI): ICD-10-CM

## 2025-05-02 DIAGNOSIS — Z78.9 STATIN INTOLERANCE: ICD-10-CM

## 2025-05-02 DIAGNOSIS — I34.0 NONRHEUMATIC MITRAL VALVE REGURGITATION: ICD-10-CM

## 2025-05-02 DIAGNOSIS — I25.5 CARDIOMYOPATHY, ISCHEMIC: ICD-10-CM

## 2025-05-02 DIAGNOSIS — I50.22 CHRONIC SYSTOLIC HEART FAILURE: ICD-10-CM

## 2025-05-02 DIAGNOSIS — Z91.148 NONCOMPLIANCE WITH MEDICATIONS: ICD-10-CM

## 2025-05-02 DIAGNOSIS — Z95.828 HX OF ENDOVASCULAR STENT GRAFT FOR ABDOMINAL AORTIC ANEURYSM: ICD-10-CM

## 2025-05-02 DIAGNOSIS — I35.0 MILD AORTIC STENOSIS: ICD-10-CM

## 2025-05-02 PROBLEM — Z79.899 HIGH RISK MEDICATION USE: Status: RESOLVED | Noted: 2024-06-28 | Resolved: 2025-05-02

## 2025-05-02 PROCEDURE — 99214 OFFICE O/P EST MOD 30 MIN: CPT | Performed by: INTERNAL MEDICINE

## 2025-05-02 PROCEDURE — 4004F PT TOBACCO SCREEN RCVD TLK: CPT | Performed by: INTERNAL MEDICINE

## 2025-05-02 PROCEDURE — 1159F MED LIST DOCD IN RCRD: CPT | Performed by: INTERNAL MEDICINE

## 2025-05-02 RX ORDER — ACETAMINOPHEN 500 MG
1 TABLET ORAL DAILY
COMMUNITY

## 2025-05-02 RX ORDER — NIACIN (INOSITOL NIACINATE) 400(500MG)
2 CAPSULE ORAL
COMMUNITY

## 2025-05-02 RX ORDER — CARVEDILOL 6.25 MG/1
6.25 TABLET ORAL 2 TIMES DAILY
COMMUNITY

## 2025-05-02 RX ORDER — PAROXETINE 30 MG/1
30 TABLET, FILM COATED ORAL DAILY
COMMUNITY
Start: 2024-12-10

## 2025-05-02 ASSESSMENT — ENCOUNTER SYMPTOMS: SHORTNESS OF BREATH: 1

## 2025-05-02 NOTE — LETTER
May 2, 2025     Jacob Yan MD  [address]  [facility] [address]    Patient: Jacob Frost   YOB: 1940   Date of Visit: 2025       Dear Dr. Jacob Yan MD:    Thank you for referring Jacob Frost to me for evaluation. Below are my notes for this consultation.  If you have questions, please do not hesitate to call me. I look forward to following your patient along with you.       Sincerely,     Viral Valle MD      CC: No Recipients  ______________________________________________________________________________________    Chief Complaint   Patient presents with   • Follow-up     6 month Follow up for Coronary Artery Disease         Subjective   Jacob Frost is a 84 y.o. male     HPI   Patient is in the office for follow-up for complex cardiovascular history.  Since he was last seen in the office he had an echocardiogram which demonstrated ejection fraction 30%.  Currently he is in a compensated state for heart failure stage C functional class IIb.  He is currently taking only carvedilol 6.25 mg twice daily.  He is not on statin which he refuses to take, he is not on aspirin because of previous history of upset stomach and mildly reduced platelet counts, he is not on ARB/ACE inhibitor/ARNI by choice as well.  His pressure is elevated today and wishes no changes in medications.  He reports no chest pain orthopnea PND or lower extremity edema, he does have dyspnea on moderate exertion.  He had no syncope or presyncope.  His wife  8 years ago.  He is not interested in prolonging his life with any measures.  He continues to smoke more than half a pack daily.  He has no desire to quit smoking.    Assessment/recommendations:     1-severe left ventricular systolic dysfunction with ischemic cardiomyopathy, ejection fraction 22% by nuclear stress test 2024, 30% by echocardiogram 2024.  He is stage C functional class IIb NYHA.  Currently taking only  "carvedilol 6.25 mg once daily.  Patient refused to consider adding any medications even though he knows he could have sudden cardiac death.  He has not had any indications of decompensated heart failure..  2- multivessel coronary artery disease status post PCI of the LAD diagonal bifurcation in the remote past and PCI of the RCA in 2021.  His left circumflex has no significant disease and left main has no significant disease.  Patient refused to take statin, aspirin or nitrates.  He refuses to consider stopping tobacco  3-mild COPD not on therapy and not willing to stop smoking.  4-status post EVAR for AAA, functioning properly  5- stage III chronic kidney disease, not on nephrotoxic medications  6-paroxysmal atrial fibrillation, currently his rhythm is regular, he stopped taking the amiodarone on his own.  He is not anticoagulated by choice.  Chance of recurrent atrial fibrillation is quite high.  7- dyslipidemia, currently not on statin therapy by choice  9-active tobacco use, was counseled on the need for tobacco cessation, not willing to stop smoking  10-moderate mitral regurgitation to be monitored noninvasively, asymptomatic  11-mild aortic stenosis, currently inconsequential  12-patient is at very high risk of sudden cardiac death and or decompensated heart failure, he has no regret for not taking medications and wishes no changes     Review of Systems   Constitutional: Positive for malaise/fatigue.   Respiratory:  Positive for shortness of breath.    All other systems reviewed and are negative.           Vitals:    05/02/25 1354 05/02/25 1420   BP: 148/80 130/78   BP Location: Left arm Left arm   Patient Position: Sitting Sitting   Pulse: 78    Weight: 61.2 kg (135 lb)    Height: 1.753 m (5' 9\")         Objective   Physical Exam  Constitutional:       Appearance: Normal appearance.   HENT:      Nose: Nose normal.   Neck:      Vascular: No carotid bruit.   Cardiovascular:      Rate and Rhythm: Normal rate.    "   Pulses: Normal pulses.      Heart sounds: Normal heart sounds.   Pulmonary:      Effort: Pulmonary effort is normal.   Abdominal:      General: Bowel sounds are normal.      Palpations: Abdomen is soft.   Musculoskeletal:         General: Normal range of motion.      Cervical back: Normal range of motion.      Right lower leg: No edema.      Left lower leg: No edema.   Skin:     General: Skin is warm and dry.   Neurological:      General: No focal deficit present.      Mental Status: He is alert.   Psychiatric:         Mood and Affect: Mood normal.         Behavior: Behavior normal.         Thought Content: Thought content normal.         Judgment: Judgment normal.         Allergies  Iodinated contrast media, Statins-hmg-coa reductase inhibitors, Valsartan, Aspirin, Codeine, Lisinopril, and Penicillins     Current Medications  Current Outpatient Medications   Medication Instructions   • carvedilol (COREG) 6.25 mg, 2 times daily   • coenzyme Q10-vitamin E 100-5 mg-unit capsule 2 capsules, Daily RT   • Lactobacillus acidophilus (Probiotic) 10 billion cell capsule 1 capsule, oral, Daily   • nitroglycerin (NITROSTAT) 0.4 mg   • pantoprazole (PROTONIX) 40 mg, Daily   • PARoxetine (PAXIL) 30 mg, Daily   • ubidecarenone (COENZYME Q10, BULK, MISC) 200 mg                        Assessment/Plan   1. Coronary artery disease involving native coronary artery of native heart, unspecified whether angina present  Follow Up In Cardiology    Follow Up In Cardiology      2. Statin intolerance        3. History of PTCA        4. Paroxysmal atrial fibrillation (Multi)        5. Cardiomyopathy, ischemic        6. Mild aortic stenosis        7. Hx of endovascular stent graft for abdominal aortic aneurysm        8. Current smoker        9. Body mass index (BMI) 19.9 or less, adult                 Scribe Attestation  By signing my name below, Josselin MENDEZ LPN  , Scribe   attest that this documentation has been prepared under the direction  and in the presence of Viral Valle MD.     Provider Attestation - Scribe documentation    All medical record entries made by the Scribe were at my direction and personally dictated by me. I have reviewed the chart and agree that the record accurately reflects my personal performance of the history, physical exam, discussion and plan.

## 2025-05-02 NOTE — PROGRESS NOTES
Chief Complaint   Patient presents with    Follow-up     6 month Follow up for Coronary Artery Disease         Subjective   Jacob Frost is a 84 y.o. male     HPI   Patient is in the office for follow-up for complex cardiovascular history.  Since he was last seen in the office he had an echocardiogram which demonstrated ejection fraction 30%.  Currently he is in a compensated state for heart failure stage C functional class IIb.  He is currently taking only carvedilol 6.25 mg twice daily.  He is not on statin which he refuses to take, he is not on aspirin because of previous history of upset stomach and mildly reduced platelet counts, he is not on ARB/ACE inhibitor/ARNI by choice as well.  His pressure is elevated today and wishes no changes in medications.  He reports no chest pain orthopnea PND or lower extremity edema, he does have dyspnea on moderate exertion.  He had no syncope or presyncope.  His wife  8 years ago.  He is not interested in prolonging his life with any measures.  He continues to smoke more than half a pack daily.  He has no desire to quit smoking.    Assessment/recommendations:     1-severe left ventricular systolic dysfunction with ischemic cardiomyopathy, ejection fraction 22% by nuclear stress test 2024, 30% by echocardiogram 2024.  He is stage C functional class IIb NYHA.  Currently taking only carvedilol 6.25 mg once daily.  Patient refused to consider adding any medications even though he knows he could have sudden cardiac death.  He has not had any indications of decompensated heart failure..  2- multivessel coronary artery disease status post PCI of the LAD diagonal bifurcation in the remote past and PCI of the RCA in .  His left circumflex has no significant disease and left main has no significant disease.  Patient refused to take statin, aspirin or nitrates.  He refuses to consider stopping tobacco  3-mild COPD not on therapy and not willing to stop  "smoking.  4-status post EVAR for AAA, functioning properly  5- stage III chronic kidney disease, not on nephrotoxic medications  6-paroxysmal atrial fibrillation, currently his rhythm is regular, he stopped taking the amiodarone on his own.  He is not anticoagulated by choice.  Chance of recurrent atrial fibrillation is quite high.  7- dyslipidemia, currently not on statin therapy by choice  9-active tobacco use, was counseled on the need for tobacco cessation, not willing to stop smoking  10-moderate mitral regurgitation to be monitored noninvasively, asymptomatic  11-mild aortic stenosis, currently inconsequential  12-patient is at very high risk of sudden cardiac death and or decompensated heart failure, he has no regret for not taking medications and wishes no changes     Review of Systems   Constitutional: Positive for malaise/fatigue.   Respiratory:  Positive for shortness of breath.    All other systems reviewed and are negative.           Vitals:    05/02/25 1354 05/02/25 1420   BP: 148/80 130/78   BP Location: Left arm Left arm   Patient Position: Sitting Sitting   Pulse: 78    Weight: 61.2 kg (135 lb)    Height: 1.753 m (5' 9\")         Objective   Physical Exam  Constitutional:       Appearance: Normal appearance.   HENT:      Nose: Nose normal.   Neck:      Vascular: No carotid bruit.   Cardiovascular:      Rate and Rhythm: Normal rate.      Pulses: Normal pulses.      Heart sounds: Normal heart sounds.   Pulmonary:      Effort: Pulmonary effort is normal.   Abdominal:      General: Bowel sounds are normal.      Palpations: Abdomen is soft.   Musculoskeletal:         General: Normal range of motion.      Cervical back: Normal range of motion.      Right lower leg: No edema.      Left lower leg: No edema.   Skin:     General: Skin is warm and dry.   Neurological:      General: No focal deficit present.      Mental Status: He is alert.   Psychiatric:         Mood and Affect: Mood normal.         Behavior: " Behavior normal.         Thought Content: Thought content normal.         Judgment: Judgment normal.         Allergies  Iodinated contrast media, Statins-hmg-coa reductase inhibitors, Valsartan, Aspirin, Codeine, Lisinopril, and Penicillins     Current Medications  Current Outpatient Medications   Medication Instructions    carvedilol (COREG) 6.25 mg, 2 times daily    coenzyme Q10-vitamin E 100-5 mg-unit capsule 2 capsules, Daily RT    Lactobacillus acidophilus (Probiotic) 10 billion cell capsule 1 capsule, oral, Daily    nitroglycerin (NITROSTAT) 0.4 mg    pantoprazole (PROTONIX) 40 mg, Daily    PARoxetine (PAXIL) 30 mg, Daily    ubidecarenone (COENZYME Q10, BULK, MISC) 200 mg                        Assessment/Plan   1. Coronary artery disease involving native coronary artery of native heart, unspecified whether angina present  Follow Up In Cardiology    Follow Up In Cardiology      2. Statin intolerance        3. History of PTCA        4. Paroxysmal atrial fibrillation (Multi)        5. Cardiomyopathy, ischemic        6. Mild aortic stenosis        7. Hx of endovascular stent graft for abdominal aortic aneurysm        8. Current smoker        9. Body mass index (BMI) 19.9 or less, adult                 Scribe Attestation  By signing my name below, I, Robina Chandra LPN   attest that this documentation has been prepared under the direction and in the presence of Viral Valle MD.     Provider Attestation - Scribe documentation    All medical record entries made by the Scribe were at my direction and personally dictated by me. I have reviewed the chart and agree that the record accurately reflects my personal performance of the history, physical exam, discussion and plan.

## 2025-06-20 ENCOUNTER — HOSPITAL ENCOUNTER (EMERGENCY)
Age: 85
Discharge: HOME | End: 2025-06-20
Payer: COMMERCIAL

## 2025-06-20 VITALS
HEART RATE: 145 BPM | SYSTOLIC BLOOD PRESSURE: 134 MMHG | DIASTOLIC BLOOD PRESSURE: 86 MMHG | OXYGEN SATURATION: 95 % | TEMPERATURE: 98.06 F

## 2025-06-20 VITALS — HEART RATE: 108 BPM | OXYGEN SATURATION: 94 %

## 2025-06-20 VITALS
TEMPERATURE: 97.52 F | DIASTOLIC BLOOD PRESSURE: 87 MMHG | OXYGEN SATURATION: 96 % | SYSTOLIC BLOOD PRESSURE: 136 MMHG | HEART RATE: 52 BPM

## 2025-06-20 DIAGNOSIS — I48.91: ICD-10-CM

## 2025-06-20 DIAGNOSIS — J44.1: Primary | ICD-10-CM

## 2025-06-20 DIAGNOSIS — R05.9: ICD-10-CM

## 2025-06-20 DIAGNOSIS — R06.02: ICD-10-CM

## 2025-06-20 LAB
ADD MANUAL DIFF: NO
ALANINE AMINOTRANSFERASE: 12 U/L (ref 16–63)
ALBUMIN GLOBULIN RATIO: 0.9
ALBUMIN LEVEL: 3.6 G/DL (ref 3.4–5)
ALKALINE PHOSPHATASE: 107 U/L (ref 46–116)
ANION GAP: 8.7
ASPARTATE AMINO TRANSFERASE: 20 U/L (ref 15–37)
BASOPHILS # BLD AUTO: 0 10^3/UL (ref 0–0.1)
BASOPHILS NFR BLD AUTO: 0.4 % (ref 0.2–2)
BILIRUBIN TOTAL: 0.6 MG/DL (ref 0.2–1)
BUN SERPL-MCNC: 24 MG/DL (ref 7–18)
BUN/CREAT SERPL: 22
CALCIUM: 9.2 MG/DL (ref 8.5–10.1)
CHLORIDE: 98 MMOL/L (ref 98–107)
CO2 BLD-SCNC: 32.7 MMOL/L (ref 21–32)
CREAT SERPL-SCNC: 1.09 MG/DL (ref 0.7–1.3)
EOSINOPHIL # BLD AUTO: 0.1 10^3/UL (ref 0–0.7)
EOSINOPHIL NFR BLD AUTO: 0.9 % (ref 0.9–7)
ERYTHROCYTE [DISTWIDTH] IN BLOOD BY AUTOMATED COUNT: 14.5 % (ref 11–15)
ESTIMATED GFR (AFRICAN AMERICA: >60
ESTIMATED GFR (NON-AFRICAN AME: >60
GLOBULIN: 3.8 G/DL
GLUCOSE SERPLBLD-MCNC: 121 MG/DL (ref 74–106)
HCT VFR BLD CALC: 45.2 % (ref 42–54)
HEMOGLOBIN: 14.8 G/DL (ref 14–18)
IMMATURE GRANULOCYTES ABS AUTO: 0.02 10^3/UL (ref 0–0.03)
IMMATURE GRANULOCYTES PCT AUTO: 0.3 % (ref 0–0.5)
INR: 1.08
LYMPHOCYTES  ABSOLUTE AUTO: 1.4 10^3/UL (ref 1.2–3.8)
LYMPHOCYTES PERCENT AUTO: 21.3 % (ref 20.5–60)
MCH RBC QN AUTO: 29.8 PG (ref 25.9–34)
MCV RBC: 90.9 FL (ref 80–94)
MEAN CORPUSCULAR HGB CONC: 32.7 G/DL (ref 29.9–35.2)
MEAN PLATELET VOLUME: 11.6 FL (ref 9.5–13.5)
MONOCYTES # BLD AUTO: 0.7 10^3/UL (ref 0.3–0.8)
MONOCYTES NFR BLD AUTO: 11 % (ref 1.7–12)
NEUTROPHILS # BLD AUTO: 4.5 10^3/UL (ref 1.4–6.5)
NEUTROPHILS NFR BLD AUTO: 66.1 % (ref 43–75)
NT PRO B TYPE NATRIURETIC PEPT: 8572 PG/ML (ref ?–1800)
PLATELET # BLD: 120 10^3/UL (ref 150–450)
POTASSIUM SERPLBLD-SCNC: 4.4 MMOL/L (ref 3.5–5.1)
PROTHROMBIN TIME: 11.4 SEC (ref 9–11.6)
RBC # BLD AUTO: 4.97 10^6/UL (ref 4.7–6.1)
SODIUM BLD-SCNC: 135 MMOL/L (ref 136–145)
TOTAL PROTEIN: 7.4 G/DL (ref 6.4–8.2)
TROPONIN I HIGH SENSITIVITY: 38.7 PG/ML (ref 4–76.1)
WBC # BLD: 6.8 10^3/UL (ref 4–11)

## 2025-06-20 PROCEDURE — 84484 ASSAY OF TROPONIN QUANT: CPT

## 2025-06-20 PROCEDURE — 99285 EMERGENCY DEPT VISIT HI MDM: CPT

## 2025-06-20 PROCEDURE — 85610 PROTHROMBIN TIME: CPT

## 2025-06-20 PROCEDURE — 96375 TX/PRO/DX INJ NEW DRUG ADDON: CPT

## 2025-06-20 PROCEDURE — 85025 COMPLETE CBC W/AUTO DIFF WBC: CPT

## 2025-06-20 PROCEDURE — 96374 THER/PROPH/DIAG INJ IV PUSH: CPT

## 2025-06-20 PROCEDURE — 94640 AIRWAY INHALATION TREATMENT: CPT

## 2025-06-20 PROCEDURE — 82805 BLOOD GASES W/O2 SATURATION: CPT

## 2025-06-20 PROCEDURE — 71045 X-RAY EXAM CHEST 1 VIEW: CPT

## 2025-06-20 PROCEDURE — 80053 COMPREHEN METABOLIC PANEL: CPT

## 2025-06-20 PROCEDURE — 83880 ASSAY OF NATRIURETIC PEPTIDE: CPT

## 2025-06-20 PROCEDURE — 36415 COLL VENOUS BLD VENIPUNCTURE: CPT

## 2025-06-20 PROCEDURE — 93005 ELECTROCARDIOGRAM TRACING: CPT

## 2025-06-27 ENCOUNTER — APPOINTMENT (OUTPATIENT)
Dept: CARDIOLOGY | Facility: CLINIC | Age: 85
End: 2025-06-27
Payer: COMMERCIAL

## 2025-07-11 ENCOUNTER — TELEPHONE (OUTPATIENT)
Dept: CARDIOLOGY | Facility: CLINIC | Age: 85
End: 2025-07-11
Payer: COMMERCIAL

## 2025-07-11 RX ORDER — FUROSEMIDE 20 MG/1
20 TABLET ORAL DAILY
COMMUNITY

## 2025-07-11 RX ORDER — POTASSIUM CHLORIDE 750 MG/1
10 TABLET, FILM COATED, EXTENDED RELEASE ORAL DAILY
COMMUNITY

## 2025-07-11 NOTE — TELEPHONE ENCOUNTER
Dr. Yan office phoned states patient was in office 7/10/25, with symptoms of acute SOB, Afib, and acute CHF. Labs orders, results will be faxed to office. Patient placed on Lasix, potassium, another CMP in 1 week. Echo ordered STAT to be done at [facility]. Inquiring about sooner OV to address patient's symptoms. Please advise.  Dr. Yan nurse, [phone]      To Dr. Viral Valle MD for review.

## 2025-07-11 NOTE — TELEPHONE ENCOUNTER
Patient phoned states he will be going to [facility] ER, requested we phone ER prior to him going. Phoned [facility] ER, advised staff of patients current condition.

## 2025-07-11 NOTE — TELEPHONE ENCOUNTER
Phoned patient orders discussed verbalized understanding. Is very reluctant to go the hospital, due to wait time.  Instructed the patient to call EMS to by pass waiting time in ER to get the required care.

## 2025-07-14 ENCOUNTER — TELEPHONE (OUTPATIENT)
Dept: CARDIOLOGY | Facility: CLINIC | Age: 85
End: 2025-07-14
Payer: COMMERCIAL

## 2025-07-14 NOTE — TELEPHONE ENCOUNTER
Voicemail from patient stating he did go to [facility] ER, and was calling to schedule follow up appointment.      To Emily Whyte to obtain 07.10-07.11.25 [facility] documentation, please send to Dr. Viral Valle MD to review.      To [facility] to call patient once received.

## 2025-07-18 NOTE — TELEPHONE ENCOUNTER
"Patient's insurance ([employer]) called to help patient schedule his hospital follow up. I advised her of Dr. Valle's last message to nursing with his recommendations and she wanted to call the patient to have him on the line as well. When I advised him of the message: \"Continue medications started in the hospital that is the Lasix, potassium and metoprolol, if he remains stable keep the scheduled visit unchanged\" and advised that appointment was for January, he laughed and said that is ridiculous.    Patient stated Dr. Valle is the one who told him to go to the ER and have him admitted. He stated he has heard from [employer], [employer], etc and the \"only one missing\" is cardiology and \"he is about done with this place.\" He stated we are dysfunctional because he called on Monday and is just now getting a response on Friday. I advised him Dr. Valle responded on the 16th after he had a chance to review the hospital records and he advised nursing of recommendations, they just have not reached out to him yet. I advised I can transfer him to the nurse line so he can speak to them and he stated \"I don't want to speak to anyone.\" Patient also stated \"I'll call you guys if I need you\" and hung up.   "

## 2025-07-18 NOTE — TELEPHONE ENCOUNTER
"Patient's insurance ([employer]) called to help patient schedule his hospital follow up. I advised her of Dr. Valle's last message to nursing with his recommendations and she wanted to call the patient to have him on the line as well. When I advised him of the message: \"Continue medications started in the hospital that is the Lasix, potassium and metoprolol, if he remains stable keep the scheduled visit unchanged\" and advised that appointment was for January, he laughed and said that is ridiculous.     Patient stated Dr. Valle is the one who told him to go to the ER and have him admitted. He stated he has heard from [facility], [facility], etc and the \"only one missing\" is cardiology and \"he is about done with this place.\" He stated we are dysfunctional because he called on Monday and is just now getting a response on Friday. I advised him Dr. Valle responded on the 16th after he had a chance to review the hospital records that were just received that day and he advised nursing of recommendations, they just have not reached out to him yet. I advised I can transfer him to the nurse line so he can speak to them and he stated \"I don't want to speak to anyone.\" Patient also stated \"I'll call you guys if I need you\" and hung up.   "

## 2026-01-07 ENCOUNTER — APPOINTMENT (OUTPATIENT)
Dept: CARDIOLOGY | Facility: CLINIC | Age: 86
End: 2026-01-07
Payer: COMMERCIAL